# Patient Record
Sex: FEMALE | NOT HISPANIC OR LATINO | URBAN - METROPOLITAN AREA
[De-identification: names, ages, dates, MRNs, and addresses within clinical notes are randomized per-mention and may not be internally consistent; named-entity substitution may affect disease eponyms.]

---

## 2017-05-01 ENCOUNTER — APPOINTMENT (RX ONLY)
Dept: URBAN - METROPOLITAN AREA CLINIC 123 | Facility: CLINIC | Age: 72
Setting detail: DERMATOLOGY
End: 2017-05-01

## 2017-05-01 DIAGNOSIS — L82.1 OTHER SEBORRHEIC KERATOSIS: ICD-10-CM

## 2017-05-01 DIAGNOSIS — L82.0 INFLAMED SEBORRHEIC KERATOSIS: ICD-10-CM

## 2017-05-01 DIAGNOSIS — D18.0 HEMANGIOMA: ICD-10-CM

## 2017-05-01 PROBLEM — D48.5 NEOPLASM OF UNCERTAIN BEHAVIOR OF SKIN: Status: ACTIVE | Noted: 2017-05-01

## 2017-05-01 PROBLEM — D18.01 HEMANGIOMA OF SKIN AND SUBCUTANEOUS TISSUE: Status: ACTIVE | Noted: 2017-05-01

## 2017-05-01 PROCEDURE — ? BIOPSY BY SHAVE METHOD

## 2017-05-01 PROCEDURE — ? COUNSELING

## 2017-05-01 PROCEDURE — 11101: CPT

## 2017-05-01 PROCEDURE — 99214 OFFICE O/P EST MOD 30 MIN: CPT | Mod: 25

## 2017-05-01 PROCEDURE — 11100: CPT

## 2017-05-01 ASSESSMENT — LOCATION DETAILED DESCRIPTION DERM
LOCATION DETAILED: SUPERIOR THORACIC SPINE
LOCATION DETAILED: RIGHT LATERAL ABDOMEN
LOCATION DETAILED: RIGHT DORSAL MIDDLE METACARPOPHALANGEAL JOINT
LOCATION DETAILED: RIGHT SUPERIOR MEDIAL UPPER BACK

## 2017-05-01 ASSESSMENT — LOCATION SIMPLE DESCRIPTION DERM
LOCATION SIMPLE: UPPER BACK
LOCATION SIMPLE: RIGHT UPPER BACK
LOCATION SIMPLE: ABDOMEN
LOCATION SIMPLE: RIGHT HAND

## 2017-05-01 ASSESSMENT — LOCATION ZONE DERM
LOCATION ZONE: HAND
LOCATION ZONE: TRUNK

## 2017-05-01 NOTE — PROCEDURE: BIOPSY BY SHAVE METHOD
Anesthesia Volume In Cc (Will Not Render If 0): 0.5
Body Location Override (Optional - Billing Will Still Be Based On Selected Body Map Location If Applicable): Right dorsal hand
Anesthesia Type: 1% lidocaine with 1:100,000 epinephrine and a 1:10 solution of 8.4% sodium bicarbonate
Dressing: bandage
Size Of Lesion In Cm: 0
Biopsy Method: Dermablade
Notification Instructions: Patient will be notified of biopsy results. However, patient instructed to call the office if not contacted within 2 weeks.
Biopsy Type: H and E
Cryotherapy Text: The wound bed was treated with cryotherapy after the biopsy was performed.
Silver Nitrate Text: The wound bed was treated with silver nitrate after the biopsy was performed.
Type Of Destruction Used: Curettage
Consent: Written consent was obtained and risks were reviewed including but not limited to scarring, infection, bleeding, scabbing, incomplete removal, nerve damage and allergy to anesthesia.
Curettage Text: The wound bed was treated with curettage after the biopsy was performed.
Wound Care: Vaseline
Hemostasis: Drysol
Detail Level: Detailed
Electrodesiccation And Curettage Text: The wound bed was treated with electrodesiccation and curettage after the biopsy was performed.
Billing Type: [employer]
Electrodesiccation Text: The wound bed was treated with electrodesiccation after the biopsy was performed.
Lab Facility: [address]
Bill For Surgical Tray: no
Triangulation (Location Of Lesion In Relation To Distance From Anatomical Landmarks): P
Lab: [facility]
Post-Care Instructions: I reviewed with the patient in detail post-care instructions. Patient is wash site daily with soap and water and apply vaseline until healed.
Anticipated Plan (Based On Presumed Biopsy Results): Stefan
Body Location Override (Optional - Billing Will Still Be Based On Selected Body Map Location If Applicable): Mid upper back
Lab Facility: [id number]
Billing Type: Third-Party Bill
Lab: [id number]

## 2017-05-01 NOTE — HPI: EVALUATION OF SKIN LESION(S)
How Severe Are Your Spot(S)?: mild
Have Your Spot(S) Been Treated In The Past?: has not been treated
Hpi Title: Evaluation of Skin Lesions
Family Member: Mother, Sister.

## 2017-05-01 NOTE — PROCEDURE: MIPS QUALITY
Quality 47: Advance Care Plan: Advance Care Planning discussed and documented in the medical record; patient did not wish or was not able to name a surrogate decision maker or provide an advance care plan.
Quality 131: Pain Assessment And Follow-Up: Pain assessment using a standardized tool is documented as negative, no follow-up plan required
Quality 111:Pneumonia Vaccination Status For Older Adults: Pneumococcal Vaccination Previously Received
Quality 130: Documentation Of Current Medications In The Medical Record: Current Medications Documented
Quality 110: Preventive Care And Screening: Influenza Immunization: Influenza Immunization previously received during influenza season
Quality 226: Preventive Care And Screening: Tobacco Use: Screening And Cessation Intervention: Patient screened for tobacco and never smoked
Detail Level: Detailed

## 2017-11-06 ENCOUNTER — APPOINTMENT (RX ONLY)
Dept: URBAN - METROPOLITAN AREA CLINIC 123 | Facility: CLINIC | Age: 72
Setting detail: DERMATOLOGY
End: 2017-11-06

## 2017-11-06 DIAGNOSIS — L81.4 OTHER MELANIN HYPERPIGMENTATION: ICD-10-CM

## 2017-11-06 DIAGNOSIS — D18.0 HEMANGIOMA: ICD-10-CM

## 2017-11-06 DIAGNOSIS — D22 MELANOCYTIC NEVI: ICD-10-CM

## 2017-11-06 DIAGNOSIS — L82.1 OTHER SEBORRHEIC KERATOSIS: ICD-10-CM

## 2017-11-06 DIAGNOSIS — L57.0 ACTINIC KERATOSIS: ICD-10-CM

## 2017-11-06 PROBLEM — D22.5 MELANOCYTIC NEVI OF TRUNK: Status: ACTIVE | Noted: 2017-11-06

## 2017-11-06 PROBLEM — D18.01 HEMANGIOMA OF SKIN AND SUBCUTANEOUS TISSUE: Status: ACTIVE | Noted: 2017-11-06

## 2017-11-06 PROBLEM — D48.5 NEOPLASM OF UNCERTAIN BEHAVIOR OF SKIN: Status: ACTIVE | Noted: 2017-11-06

## 2017-11-06 PROCEDURE — 11100: CPT | Mod: 59

## 2017-11-06 PROCEDURE — 17003 DESTRUCT PREMALG LES 2-14: CPT

## 2017-11-06 PROCEDURE — 11101: CPT

## 2017-11-06 PROCEDURE — ? LIQUID NITROGEN

## 2017-11-06 PROCEDURE — ? COUNSELING

## 2017-11-06 PROCEDURE — 17000 DESTRUCT PREMALG LESION: CPT

## 2017-11-06 PROCEDURE — ? BIOPSY BY SHAVE METHOD

## 2017-11-06 PROCEDURE — 99214 OFFICE O/P EST MOD 30 MIN: CPT | Mod: 25

## 2017-11-06 ASSESSMENT — LOCATION SIMPLE DESCRIPTION DERM
LOCATION SIMPLE: LEFT CHEEK
LOCATION SIMPLE: ABDOMEN
LOCATION SIMPLE: RIGHT LIP
LOCATION SIMPLE: CHEST
LOCATION SIMPLE: RIGHT UPPER BACK
LOCATION SIMPLE: RIGHT CHEEK
LOCATION SIMPLE: RIGHT PRETIBIAL REGION

## 2017-11-06 ASSESSMENT — LOCATION DETAILED DESCRIPTION DERM
LOCATION DETAILED: RIGHT SUPERIOR MEDIAL UPPER BACK
LOCATION DETAILED: RIGHT UPPER CUTANEOUS LIP
LOCATION DETAILED: RIGHT CENTRAL MALAR CHEEK
LOCATION DETAILED: RIGHT MEDIAL SUPERIOR CHEST
LOCATION DETAILED: RIGHT DISTAL PRETIBIAL REGION
LOCATION DETAILED: LEFT LATERAL SUBMANDIBULAR CHEEK
LOCATION DETAILED: RIGHT LATERAL ABDOMEN

## 2017-11-06 ASSESSMENT — LOCATION ZONE DERM
LOCATION ZONE: FACE
LOCATION ZONE: TRUNK
LOCATION ZONE: LEG
LOCATION ZONE: LIP

## 2017-11-06 NOTE — PROCEDURE: LIQUID NITROGEN
Duration Of Freeze Thaw-Cycle (Seconds): 5
Render Post-Care Instructions In Note?: no
Consent: The patient's consent was obtained including but not limited to risks of crusting, scabbing, blistering, scarring, darker or lighter pigmentary change, recurrence, incomplete removal and infection.
Number Of Freeze-Thaw Cycles: 1 freeze-thaw cycle
Detail Level: Simple
Post-Care Instructions: I reviewed with the patient in detail post-care instructions. Patient is to wear sunprotection, and avoid picking at any of the treated lesions. Pt may apply Vaseline to crusted or scabbing areas.

## 2017-11-06 NOTE — PROCEDURE: BIOPSY BY SHAVE METHOD
Anesthesia Type: 1% lidocaine without epinephrine and a 1:10 solution of 8.4% sodium bicarbonate
Notification Instructions: Patient will be notified of biopsy results. However, patient instructed to call the office if not contacted within 2 weeks.
Biopsy Method: Personna blade
Body Location Override (Optional - Billing Will Still Be Based On Selected Body Map Location If Applicable): left distal lower leg
X Size Of Lesion In Cm: 0
Type Of Destruction Used: Curettage
Billing Type: [employer]
Lab: [facility]
Bill For Surgical Tray: no
Dressing: bandage
Biopsy Type: H and E
Silver Nitrate Text: The wound bed was treated with silver nitrate after the biopsy was performed.
Anticipated Plan (Based On Presumed Biopsy Results): ALYCIA
Electrodesiccation Text: The wound bed was treated with electrodesiccation after the biopsy was performed.
Post-Care Instructions: I reviewed with the patient in detail post-care instructions. Patient is to keep the biopsy site clean and apply vaseline twice daily until healed.
Lab Facility: [address] [facility] [address]
Detail Level: Simple
Cryotherapy Text: The wound bed was treated with cryotherapy after the biopsy was performed.
Consent: Written consent was obtained and risks were reviewed including but not limited to scarring, infection, bleeding, scabbing, incomplete removal, nerve damage and allergy to anesthesia.
Hemostasis: Drysol
Electrodesiccation And Curettage Text: The wound bed was treated with electrodesiccation and curettage after the biopsy was performed.
Wound Care: Petrolatum
Anesthesia Volume In Cc (Will Not Render If 0): 0.3
Curettage Text: The wound bed was treated with curettage after the biopsy was performed.
Lab: [id number]
Body Location Override (Optional - Billing Will Still Be Based On Selected Body Map Location If Applicable): left medial calf
Billing Type: Third-Party Bill
Triangulation (Location Of Lesion In Relation To Distance From Anatomical Landmarks): E
Lab Facility: [id number]

## 2018-01-01 ENCOUNTER — APPOINTMENT (RX ONLY)
Dept: URBAN - METROPOLITAN AREA CLINIC 123 | Facility: CLINIC | Age: 73
Setting detail: DERMATOLOGY
End: 2018-01-01

## 2018-01-01 ENCOUNTER — RX ONLY (OUTPATIENT)
Age: 73
Setting detail: RX ONLY
End: 2018-01-01

## 2018-01-01 DIAGNOSIS — L24.4 IRRITANT CONTACT DERMATITIS DUE TO DRUGS IN CONTACT WITH SKIN: ICD-10-CM

## 2018-01-01 DIAGNOSIS — L82.1 OTHER SEBORRHEIC KERATOSIS: ICD-10-CM

## 2018-01-01 DIAGNOSIS — L57.0 ACTINIC KERATOSIS: ICD-10-CM

## 2018-01-01 PROCEDURE — 99214 OFFICE O/P EST MOD 30 MIN: CPT | Mod: 25

## 2018-01-01 PROCEDURE — ? BIOPSY BY SHAVE METHOD

## 2018-01-01 PROCEDURE — ? COUNSELING

## 2018-01-01 PROCEDURE — ? DIAGNOSIS COMMENT

## 2018-01-01 PROCEDURE — 11101: CPT

## 2018-01-01 PROCEDURE — ? LIQUID NITROGEN

## 2018-01-01 PROCEDURE — 11100: CPT | Mod: 59

## 2018-01-01 PROCEDURE — 17000 DESTRUCT PREMALG LESION: CPT

## 2018-01-01 PROCEDURE — 17003 DESTRUCT PREMALG LES 2-14: CPT

## 2018-01-01 RX ORDER — FLUOROURACIL 2 G/40G
CREAM TOPICAL
Qty: 1 | Refills: 1 | Status: ERX | COMMUNITY
Start: 2018-01-01

## 2018-01-01 ASSESSMENT — LOCATION SIMPLE DESCRIPTION DERM
LOCATION SIMPLE: RIGHT UPPER BACK
LOCATION SIMPLE: LEFT LIP
LOCATION SIMPLE: RIGHT ANTERIOR NECK
LOCATION SIMPLE: RIGHT LIP
LOCATION SIMPLE: NECK
LOCATION SIMPLE: LEFT NOSE
LOCATION SIMPLE: RIGHT UPPER BACK

## 2018-01-01 ASSESSMENT — LOCATION DETAILED DESCRIPTION DERM
LOCATION DETAILED: RIGHT UPPER CUTANEOUS LIP
LOCATION DETAILED: LEFT CENTRAL LATERAL NECK
LOCATION DETAILED: RIGHT SUPERIOR LATERAL NECK
LOCATION DETAILED: RIGHT SUPERIOR MEDIAL UPPER BACK
LOCATION DETAILED: RIGHT SUPERIOR MEDIAL UPPER BACK
LOCATION DETAILED: LEFT NARIS
LOCATION DETAILED: LEFT UPPER CUTANEOUS LIP

## 2018-01-01 ASSESSMENT — LOCATION ZONE DERM
LOCATION ZONE: NECK
LOCATION ZONE: LIP
LOCATION ZONE: TRUNK
LOCATION ZONE: NECK
LOCATION ZONE: NOSE
LOCATION ZONE: TRUNK

## 2018-05-07 PROBLEM — D48.5 NEOPLASM OF UNCERTAIN BEHAVIOR OF SKIN: Status: ACTIVE | Noted: 2018-01-01

## 2018-05-07 PROBLEM — L82.1 OTHER SEBORRHEIC KERATOSIS: Status: ACTIVE | Noted: 2018-01-01

## 2018-05-07 PROBLEM — L57.0 ACTINIC KERATOSIS: Status: ACTIVE | Noted: 2018-01-01

## 2018-05-07 NOTE — PROCEDURE: BIOPSY BY SHAVE METHOD
Was A Bandage Applied: Yes
Bill For Surgical Tray: no
Size Of Lesion In Cm: 0
Consent: Written consent was obtained and risks were reviewed including but not limited to scarring, infection, bleeding, scabbing, incomplete removal, nerve damage and allergy to anesthesia.
Lab Facility: [id number]
Wound Care: Vaseline
Dressing: bandage
Biopsy Method: Dermablade
Biopsy Type: H and E
Body Location Override (Optional - Billing Will Still Be Based On Selected Body Map Location If Applicable): re biopsy- right upper lip
Electrodesiccation Text: The wound bed was treated with electrodesiccation after the biopsy was performed.
Billing Type: [employer]
Curettage Text: The wound bed was treated with curettage after the biopsy was performed.
Anticipated Plan (Based On Presumed Biopsy Results): M if AK tx c Efudex (needs Rx)
Type Of Destruction Used: Curettage
Anesthesia Type: 1% lidocaine without epinephrine and a 1:10 solution of 8.4% sodium bicarbonate
Silver Nitrate Text: The wound bed was treated with silver nitrate after the biopsy was performed.
Lab: [facility]
Detail Level: Detailed
Hemostasis: Drysol
Post-Care Instructions: I reviewed with the patient in detail post-care instructions. Patient is wash site daily with soap and water and apply vaseline until healed.
Electrodesiccation And Curettage Text: The wound bed was treated with electrodesiccation and curettage after the biopsy was performed.
Cryotherapy Text: The wound bed was treated with cryotherapy after the biopsy was performed.
Anesthesia Volume In Cc (Will Not Render If 0): 0.5
Notification Instructions: Patient will be notified of biopsy results. However, patient instructed to call the office if not contacted within 2 weeks.
Triangulation (Location Of Lesion In Relation To Distance From Anatomical Landmarks): Coreen Almeida
Billing Type: Third-Party Bill
Body Location Override (Optional - Billing Will Still Be Based On Selected Body Map Location If Applicable): left infra nares
Lab: [id number]
Anticipated Plan (Based On Presumed Biopsy Results): Manuel Sorto
Lab Facility: [address] [address]
Triangulation (Location Of Lesion In Relation To Distance From Anatomical Landmarks): Paola Hawk
Anticipated Plan (Based On Presumed Biopsy Results): Saurabh Galvan
Body Location Override (Optional - Billing Will Still Be Based On Selected Body Map Location If Applicable): right mid eye brow
Lab Facility: [id number]
Body Location Override (Optional - Billing Will Still Be Based On Selected Body Map Location If Applicable): right neck
Anticipated Plan (Based On Presumed Biopsy Results): Sofy Spear
Triangulation (Location Of Lesion In Relation To Distance From Anatomical Landmarks): Modesto Powell

## 2018-05-07 NOTE — HPI: EVALUATION OF SKIN LESION(S)
How Severe Are Your Spot(S)?: mild
Have Your Spot(S) Been Treated In The Past?: has not been treated
Hpi Title: Evaluation of Skin Lesions
Family Member: Mother,sister

## 2018-11-05 PROBLEM — L57.0 ACTINIC KERATOSIS: Status: ACTIVE | Noted: 2018-01-01

## 2018-11-05 PROBLEM — L24.4 IRRITANT CONTACT DERMATITIS DUE TO DRUGS IN CONTACT WITH SKIN: Status: ACTIVE | Noted: 2018-01-01

## 2018-11-05 PROBLEM — L82.1 OTHER SEBORRHEIC KERATOSIS: Status: ACTIVE | Noted: 2018-01-01

## 2018-11-05 PROBLEM — D48.5 NEOPLASM OF UNCERTAIN BEHAVIOR OF SKIN: Status: ACTIVE | Noted: 2018-01-01

## 2018-11-05 NOTE — PROCEDURE: DIAGNOSIS COMMENT
Detail Level: Detailed
Comment: Path 5/7/18 \\nRight Neck
Comment: Path 5/7/18 \\nRight Upper Lip\\nLeft Infranares

## 2018-11-05 NOTE — PROCEDURE: BIOPSY BY SHAVE METHOD
Anesthesia Volume In Cc (Will Not Render If 0): 0.5
Bill For Surgical Tray: no
Electrodesiccation And Curettage Text: The wound bed was treated with electrodesiccation and curettage after the biopsy was performed.
Size Of Lesion In Cm: 0
Post-Care Instructions: I reviewed with the patient in detail post-care instructions. Patient is wash site daily with soap and water and apply vaseline until healed.
Body Location Override (Optional - Billing Will Still Be Based On Selected Body Map Location If Applicable): Left Upper Arm
Consent: Written consent was obtained and risks were reviewed including but not limited to scarring, infection, bleeding, scabbing, incomplete removal, nerve damage and allergy to anesthesia.
Detail Level: Detailed
Cryotherapy Text: The wound bed was treated with cryotherapy after the biopsy was performed.
Anticipated Plan (Based On Presumed Biopsy Results): E
Dressing: bandage
Type Of Destruction Used: Curettage
Biopsy Type: H and E
Curettage Text: The wound bed was treated with curettage after the biopsy was performed.
Lab Facility: [address] [address]
Anesthesia Type: 1% lidocaine without epinephrine and a 1:10 solution of 8.4% sodium bicarbonate
Silver Nitrate Text: The wound bed was treated with silver nitrate after the biopsy was performed.
Wound Care: Vaseline
Electrodesiccation Text: The wound bed was treated with electrodesiccation after the biopsy was performed.
Notification Instructions: Patient will be notified of biopsy results. However, patient instructed to call the office if not contacted within 2 weeks.
Billing Type: [employer]
Hemostasis: Drysol
Was A Bandage Applied: Yes
Biopsy Method: Dermablade
Lab: [facility]
Depth Of Biopsy: dermis
Triangulation (Location Of Lesion In Relation To Distance From Anatomical Landmarks): Dragan Mckenzie
Body Location Override (Optional - Billing Will Still Be Based On Selected Body Map Location If Applicable): Right Inferior NLF
Billing Type: Third-Party Bill
Lab Facility: [id number]
Anticipated Plan (Based On Presumed Biopsy Results): Erasmo Miguel
Lab: [id number]

## 2019-01-01 ENCOUNTER — APPOINTMENT (RX ONLY)
Dept: URBAN - METROPOLITAN AREA CLINIC 123 | Facility: CLINIC | Age: 74
Setting detail: DERMATOLOGY
End: 2019-01-01

## 2019-01-01 DIAGNOSIS — Z01.818 ENCOUNTER FOR OTHER PREPROCEDURAL EXAMINATION: ICD-10-CM

## 2019-01-01 PROCEDURE — ? COUNSELING

## 2019-01-01 PROCEDURE — ? DIAGNOSIS COMMENT

## 2019-01-01 ASSESSMENT — LOCATION DETAILED DESCRIPTION DERM: LOCATION DETAILED: RIGHT INFERIOR MEDIAL MALAR CHEEK

## 2019-01-01 ASSESSMENT — LOCATION SIMPLE DESCRIPTION DERM: LOCATION SIMPLE: RIGHT CHEEK

## 2019-01-01 ASSESSMENT — LOCATION ZONE DERM: LOCATION ZONE: FACE

## 2019-02-04 PROBLEM — Z01.818 ENCOUNTER FOR OTHER PREPROCEDURAL EXAMINATION: Status: ACTIVE | Noted: 2019-01-01
